# Patient Record
Sex: MALE | Race: OTHER | HISPANIC OR LATINO | URBAN - METROPOLITAN AREA
[De-identification: names, ages, dates, MRNs, and addresses within clinical notes are randomized per-mention and may not be internally consistent; named-entity substitution may affect disease eponyms.]

---

## 2024-08-13 ENCOUNTER — EMERGENCY (EMERGENCY)
Facility: HOSPITAL | Age: 24
LOS: 1 days | End: 2024-08-13
Attending: EMERGENCY MEDICINE
Payer: COMMERCIAL

## 2024-08-13 VITALS
DIASTOLIC BLOOD PRESSURE: 85 MMHG | SYSTOLIC BLOOD PRESSURE: 150 MMHG | HEART RATE: 92 BPM | HEIGHT: 78 IN | WEIGHT: 287.04 LBS | TEMPERATURE: 99 F | RESPIRATION RATE: 20 BRPM | OXYGEN SATURATION: 96 %

## 2024-08-13 PROCEDURE — 99284 EMERGENCY DEPT VISIT MOD MDM: CPT | Mod: 25

## 2024-08-13 PROCEDURE — 12001 RPR S/N/AX/GEN/TRNK 2.5CM/<: CPT

## 2024-08-13 PROCEDURE — 99283 EMERGENCY DEPT VISIT LOW MDM: CPT

## 2024-08-13 RX ORDER — CEPHALEXIN 250 MG
500 CAPSULE ORAL ONCE
Refills: 0 | Status: COMPLETED | OUTPATIENT
Start: 2024-08-13 | End: 2024-08-13

## 2024-08-13 RX ORDER — CEPHALEXIN 250 MG
1 CAPSULE ORAL
Qty: 20 | Refills: 0
Start: 2024-08-13 | End: 2024-08-17

## 2024-08-13 RX ADMIN — Medication 500 MILLIGRAM(S): at 04:15

## 2024-08-13 NOTE — ED PROVIDER NOTE - PHYSICAL EXAMINATION
Gen: Nontoxic, well appearing, in NAD.  Skin: Warm and dry as visualized. Approx. 1.5cm superficial linear laceration to left anterior forearm. No active bleeding.   Head: NC/AT.  Eyes: PERRLA. EOMI.  Neck: Supple, FROM. Trachea midline.   Resp: No distress.  Cardio: Well perfused.  PV: 2+ radial pulses.   Ext: No deformities. MAEx4. FROM.   Neuro: A&Ox3. Sensation intact.   Psych: Normal affect and mood.

## 2024-08-13 NOTE — ED PROVIDER NOTE - PATIENT PORTAL LINK FT
You can access the FollowMyHealth Patient Portal offered by [facility] by registering at the following website: [email or web address]. By joining [employer]’s FollowMyHealth portal, you will also be able to view your health information using other applications (apps) compatible with our system.

## 2024-08-13 NOTE — ED PROVIDER NOTE - NSFOLLOWUPINSTRUCTIONS_ED_ALL_ED_FT
- Prescription sent to pharmacy.  - Ibuprofen 600mg every 6 hours as needed for pain.  - Acetaminophen 650mg every 6 hours as needed for pain.   - Keep dressing clean, dry and in place for 24 hours. Then, may remove and cleanse using soap and water.  - Apply Bacitracin as needed.  - Suture removal 7-10 days. May present to primary care doctor, urgent care, or ED.  - Please follow up with your primary care physician in 48 hours for wound check.  - Please seek immediate medical attention for any new/worsening, signs/symptoms or concerns including but not limited to severe pain/swelling/surrounding redness, or drainage from wound.    Feel better!      Sutured Wound Care      Sutures are stitches that can be used to close wounds. Taking care of your wound properly can help to prevent pain and infection. It can also help your wound to heal more quickly. Follow instructions from your health care provider about how to care for your sutured wound.      Supplies needed:    •Soap and water.      •A clean bandage (dressing), if needed.      •Antibiotic ointment.      •A clean towel.        How to care for your sutured wound     •Keep the wound completely dry for the first 24 hours, or for as long as directed by your health care provider. After 24–48 hours, you may shower or bathe as directed by your health care provider. Do not soak or submerge the wound in water until the sutures have been removed.    •After the first 24 hours, clean the wound once a day, or as often as directed by your health care provider, using the following steps:  •Wash the wound with soap and water.      •Rinse the wound with water to remove all soap.      •Pat the wound dry with a clean towel. Do not rub the wound.        •After cleaning the wound, apply a thin layer of antibiotic ointment as directed by your health care provider. This will prevent infection and keep the dressing from sticking to the wound.    •Follow instructions from your health care provider about how to change your dressing:  •Wash your hands with soap and water. If soap and water are not available, use hand .      •Change your dressing at least once a day, or as often as told by your health care provider. If your dressing gets wet or dirty, change it.      •Leave sutures and other skin closures, such as adhesive tape or skin glue, in place. These skin closures may need to stay in place for 2 weeks or longer. If adhesive strip edges start to loosen and curl up, you may trim the loose edges. Do not remove adhesive strips completely unless your health care provider tells you to do that.      •Check your wound every day for signs of infection. Watch for:  •Redness, swelling, or pain.      •Fluid or blood.      •Warmth.      •Pus or a bad smell.        •Have the sutures removed as directed by your health care provider.        Follow these instructions at home:    Medicines     •Take or apply over-the-counter and prescription medicines only as told by your health care provider.      •If you were prescribed an antibiotic medicine or ointment, take or apply it as told by your health care provider. Do not stop using the antibiotic even if your condition improves.      General instructions     •To help reduce scarring after your wound heals, cover your wound with clothing or apply sunscreen of at least 30 SPF whenever you are outside.      • Do not scratch or pick at your wound.      •Avoid stretching your wound.      •Raise (elevate) the injured area above the level of your heart while you are sitting or lying down, if possible.      •Drink enough fluids to keep your urine clear or pale yellow.      •Keep all follow-up visits as told by your health care provider. This is important.        Contact a health care provider if:    •You received a tetanus shot and you have swelling, severe pain, redness, or bleeding at the injection site.      •Your wound breaks open.      •You have redness, swelling, or pain around your wound.      •You have fluid or blood coming from your wound.      •Your wound feels warm to the touch.      •You have a fever.      •You notice something coming out of your wound, such as wood or glass.      •You have pain that does not get better with medicine.      •The skin near your wound changes color.      •You need to change your dressing very frequently due to a lot of fluid, blood, or pus draining from the wound.      •You develop a new rash.      •You develop numbness around the wound.        Get help right away if:    •You develop severe swelling around your wound.      •You have pus or a bad smell coming from your wound.      •Your pain suddenly gets worse and is severe.      •You develop painful lumps near your wound or anywhere on your body.      •You have a red streak going away from your wound.    •The wound is on your hand or foot and:  •You cannot properly move a finger or toe.      •Your fingers or toes look pale or bluish.      •You have numbness that is spreading down your hand, foot, fingers, or toes.          Summary    •Sutures are stitches that can be used to close wounds.      •Taking care of your wound properly can help to prevent pain and infection.      •Keep the wound completely dry for the first 24 hours, or for as long as directed by your health care provider. After 24–48 hours, you may shower or bathe as directed by your health care provider.      This information is not intended to replace advice given to you by your health care provider. Make sure you discuss any questions you have with your health care provider.

## 2024-08-13 NOTE — ED PROVIDER NOTE - ATTENDING APP SHARED VISIT CONTRIBUTION OF CARE
I personally saw the patient with the PA, and completed the key components of the history and physical exam. I then discussed the management plan with the PA.   Gen: Nontoxic, well appearing, in NAD.  Skin: Warm and dry as visualized. Approx. 1.5cm superficial linear laceration to left anterior forearm. No active bleeding.   Head: NC/AT.  Eyes: PERRLA. EOMI.  Neck: Supple, FROM. Trachea midline.   Resp: No distress.  Cardio: Well perfused.  PV: 2+ radial pulses.   Ext: No deformities. MAEx4. FROM.   Neuro: A&Ox3. Sensation intact.   wound care precautions given

## 2024-08-13 NOTE — ED ADULT TRIAGE NOTE - CHIEF COMPLAINT QUOTE
Patient presents to ED s/p cutting his anterior left forearm accidentally with a knife while cutting a zip tie.  1/4 laceration noted to left forearm, area cleansed with NS, DSD and pressure wrap applied.

## 2024-08-13 NOTE — ED PROVIDER NOTE - CLINICAL SUMMARY MEDICAL DECISION MAKING FREE TEXT BOX
23 yo male no PMHx presents to ED c/o left forearm laceration. Patient was cutting Zip Tie with knife and accidentally slipped and cut self. Tetanus UTD. NVIT. Wound repaired, abx. Medically stable for discharge.

## 2024-08-13 NOTE — ED PROVIDER NOTE - OBJECTIVE STATEMENT
25 yo male no PMHx presents to ED c/o left forearm laceration. Patient was cutting Zip Tie with knife and accidentally slipped and cut self. Tetanus UTD. No further complaints at this time.